# Patient Record
Sex: MALE | Race: WHITE | Employment: FULL TIME | URBAN - METROPOLITAN AREA
[De-identification: names, ages, dates, MRNs, and addresses within clinical notes are randomized per-mention and may not be internally consistent; named-entity substitution may affect disease eponyms.]

---

## 2020-11-24 ENCOUNTER — VIRTUAL VISIT (OUTPATIENT)
Dept: FAMILY MEDICINE CLINIC | Age: 40
End: 2020-11-24
Payer: COMMERCIAL

## 2020-11-24 DIAGNOSIS — E66.9 OBESITY, UNSPECIFIED CLASSIFICATION, UNSPECIFIED OBESITY TYPE, UNSPECIFIED WHETHER SERIOUS COMORBIDITY PRESENT: ICD-10-CM

## 2020-11-24 DIAGNOSIS — F32.A ANXIETY AND DEPRESSION: ICD-10-CM

## 2020-11-24 DIAGNOSIS — Z76.89 ENCOUNTER TO ESTABLISH CARE WITH NEW DOCTOR: Primary | ICD-10-CM

## 2020-11-24 DIAGNOSIS — F41.9 ANXIETY AND DEPRESSION: ICD-10-CM

## 2020-11-24 DIAGNOSIS — Z13.1 SCREENING FOR DIABETES MELLITUS: ICD-10-CM

## 2020-11-24 DIAGNOSIS — Z13.220 SCREENING FOR LIPID DISORDERS: ICD-10-CM

## 2020-11-24 PROCEDURE — 99204 OFFICE O/P NEW MOD 45 MIN: CPT | Performed by: FAMILY MEDICINE

## 2020-11-24 RX ORDER — BUPROPION HYDROCHLORIDE 150 MG/1
150 TABLET ORAL
Qty: 30 TAB | Refills: 2 | Status: SHIPPED | OUTPATIENT
Start: 2020-11-24 | End: 2021-01-05

## 2020-11-24 NOTE — PROGRESS NOTES
Edie Jarrett, who was evaluated through a synchronous (real-time) audio-video encounter, and/or his healthcare decision maker, is aware that it is a billable service, with coverage as determined by his insurance carrier. He provided verbal consent to proceed: YES, and patient identification was verified. It was conducted pursuant to the emergency declaration under the [facility], [facility] authority and the Rajat Apperian and [employer] General Act. A caregiver was present when appropriate. Ability to conduct physical exam was limited. I was at home. The patient was at home. This virtual visit was conducted via [employer]. Pursuant to the emergency declaration under the [facility], [address] [place] [address] authority and the [employer] and [employer] Act, this Virtual  Visit was conducted to reduce the patient's risk of exposure to COVID-19 and provide continuity of care for an established patient. Services were provided through a video synchronous discussion virtually to substitute for in-person clinic visit. Due to this being a TeleHealth evaluation, many elements of the physical examination are unable to be assessed. Total Time: minutes: 11-20 minutes. Kiran Lara MD    [id number]  Subjective:   Edie Jarrett was seen for New Patient    Hx of anxiety, panic attacks, and depression. Has had increased anxiety due to COVID. 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 He would like ADA paperwork to allow him to work remotely at home and attend properly socially distanced meetings. He also reports increased weight gain. Currently weights 274 lb at 5'11\". Previously took Lexapro which made him feel manic; does not have any manic symptoms at baseline. Open to trying other medications.      Prior to Admission medications    Medication Sig Start Date End Date Taking? Authorizing Provider   loratadine (CLARITIN) 10 mg tablet Take 10 mg by mouth. Provider, Historical   MULTIVITAMIN/IRON/FOLIC ACID (CENTRUM COMPLETE PO) Take  by mouth. Provider, Historical       No Known Allergies    Review of Systems   Constitutional: Negative for chills, fever, malaise/fatigue and weight loss. HENT: Negative for hearing loss, nosebleeds and sore throat. Respiratory: Negative for cough, sputum production, shortness of breath and wheezing. Cardiovascular: Negative for chest pain, palpitations, leg swelling and PND. Gastrointestinal: Negative for abdominal pain, blood in stool, constipation, diarrhea, nausea and vomiting. Genitourinary: Negative for dysuria, frequency and urgency. Musculoskeletal: Negative for back pain, falls, joint pain, myalgias and neck pain. Skin: Negative for itching and rash. Neurological: Negative for dizziness, sensory change, focal weakness and loss of consciousness. Psychiatric/Behavioral: Negative for depression. The patient is not nervous/anxious. All other systems reviewed and are negative.         Past Medical History:   Diagnosis Date    Allergic rhinitis     Anxiety and depression     HTN, goal below 140/90     Scoliosis        Past Surgical History:   Procedure Laterality Date    HX WISDOM TEETH EXTRACTION         Family History   Problem Relation Age of Onset    Hypertension Father     High Cholesterol Father     Heart Disease Father     Breast Cancer Paternal Aunt         breast    Diabetes Maternal Grandfather     Cancer Maternal Grandfather     Breast Cancer Paternal Grandmother     Cancer Paternal Uncle         Bone    Prostate Cancer Maternal Uncle        Social History     Socioeconomic History    Marital status: SINGLE     Spouse name: Not on file    Number of children: Not on file    Years of education: Not on file    Highest education level: Not on file   Occupational History    Not on file   Social Needs    Financial resource strain: Not on file    Food insecurity     Worry: Not on file     Inability: Not on file    Transportation needs     Medical: Not on file     Non-medical: Not on file   Tobacco Use    Smoking status: Former Smoker     Packs/day: 1.50     Years: 15.00     Pack years: 22.50     Types: Cigarettes    Smokeless tobacco: Never Used   Substance and Sexual Activity    Alcohol use: Yes     Frequency: 2-4 times a month    Drug use: Never    Sexual activity: Yes     Partners: Female   Lifestyle    Physical activity     Days per week: Not on file     Minutes per session: Not on file    Stress: Not on file   Relationships    Social connections     Talks on phone: Not on file     Gets together: Not on file     Attends [employer] service: Not on file     Active member of club or organization: Not on file     Attends meetings of clubs or organizations: Not on file     Relationship status: Not on file    Intimate partner violence     Fear of current or ex partner: Not on file     Emotionally abused: Not on file     Physically abused: Not on file     Forced sexual activity: Not on file   Other Topics Concern    Not on file   Social History Narrative    Not on file         Physical Exam:     There were no vitals taken for this visit. General: alert, cooperative, no distress   Mental  status: normal mood, behavior, speech, dress, motor activity, and thought processes, able to follow commands   HENT: NCAT   Neck: no visualized mass   Resp: no respiratory distress   Neuro: no gross deficits   Skin: no discoloration or lesions of concern on visible areas   Psychiatric: normal affect, consistent with stated mood, no evidence of hallucinations       Assessment & Plan:     Antonio Cutler is a 45 y.o. male who presents today for:    1. Encounter to establish care with new doctor    2. Anxiety and depression  Will start Wellbutrin. Will look out for his paperwork to support working from home due to COVID-related anxiety and obesity as a possible COVID risk factor.  - REFERRAL TO BEHAVIORAL HEALTH  - buPROPion XL (WELLBUTRIN XL) 150 mg tablet; Take 1 Tab by mouth every morning. Dispense: 30 Tab; Refill: 2    3. Obesity, unspecified classification, unspecified obesity type, unspecified whether serious comorbidity present  I have reviewed/discussed the above normal BMI with the patient. I have recommended the following interventions: dietary management education, guidance, and counseling, encourage exercise, monitor weight and prescribed dietary intake.   - TSH 3RD GENERATION; Future  - T4 (THYROXINE); Future    4. Screening for diabetes mellitus  - HEMOGLOBIN A1C WITH EAG; Future    5. Screening for lipid disorders  - CBC W/O DIFF; Future  - METABOLIC PANEL, COMPREHENSIVE; Future  - LIPID PANEL; Future    Medications Discontinued During This Encounter   Medication Reason    loratadine (CLARITIN) 10 mg tablet Therapy Completed    MULTIVITAMIN/IRON/FOLIC ACID (CENTRUM COMPLETE PO) Therapy Completed     Follow-up and Dispositions    · Return in about 6 weeks (around 1/5/2021) for Medication Check. Treatment risks/benefits/costs/interactions/alternatives discussed with patient. Advised patient to call back or return to office if symptoms worsen/change/persist. If patient cannot reach us or should anything more severe/urgent arise he/she should proceed directly to the nearest emergency department. Discussed expected course/resolution/complications of diagnosis in detail with patient. Patient expressed understanding with the diagnosis and plan. Thomas Nuñez M.D.

## 2020-11-25 ENCOUNTER — VIRTUAL VISIT (OUTPATIENT)
Dept: FAMILY MEDICINE CLINIC | Age: 40
End: 2020-11-25
Payer: COMMERCIAL

## 2020-11-25 DIAGNOSIS — F32.A ANXIETY AND DEPRESSION: Primary | ICD-10-CM

## 2020-11-25 DIAGNOSIS — F41.9 ANXIETY AND DEPRESSION: Primary | ICD-10-CM

## 2020-11-25 PROCEDURE — 90791 PSYCH DIAGNOSTIC EVALUATION: CPT | Performed by: SOCIAL WORKER

## 2020-11-25 NOTE — PROGRESS NOTES
This note will not be viewable in [facility] for the following reason(s). This is a Psychotherapy Note. ([address], [facility] [address] Providers Only)   Virtual INITIAL AT HOME appt:  Met with Mr. Lewis Tomlinson today for our first appt. He identifies anxiety and depressive symptoms being present for the past 20 years. He started therapy at age 24 and has been in and out of therapy since. He identifies panic, anxiety, obsessive thinking, overthinking issues, sensitivity, emotional eating (has gained 130 lbs in the past 3-1/2 years) and having difficulty getting out of bed due to feeling so sad in the past.  He has never identifies suicidal thoughts however he does report using alcohol in the past as a crutch for his depression. Mr. Lewis Tomlinson grew up in [place] [address]. His parents, both 72 reside in [place] [address] and his 35year old brother just recently moved to [place]. His parents have been  40+ years. His mother is a retired  and his father continues to work as a . He reports that his father and other family on both sides have alcohol issues. Mr. Lewis Tomlinson states that he can not control his alcohol and it is just better \"not to  any alcohol. \"  Additionally he reports that there is anxiety and depression with his father but he doesn't receive counseling or medication to assist.    Mr. Lewis Tomlinson left [place] and attended college at [employer] for his undergraduate degree. He then came back to [place] and completed his graduate degree at [employer] in [facility] [employer]. He is currently employed as the [email or web address] for [employer]. He was engaged at one time but is now single. He would like to meet someone and get  in the future. Mr. Lewis Tomlinson used to take medication but had adverse side effects while taking lexapro (reports saritha). He has recently been prescribed Wellbutrin by his PCP, Dr. Kadeem Coe. Mr. Lewis Tomlinson is a 45year old male that presents as alert and oriented.   His speech was somewhat pressured and verbose. He denies any suicidal or homicidal ideations. He is not psychotic or delusional.  He has good insight and judgement. He reports he is an emotional/stress eater and needs to get back into exercising and better eating habits. His sleep is good. He maintained good eye contact. With his long history of therapy and his request to continue with long term-on going therapy, this therapist recommended some community resources. We have scheduled a follow up however, in case he can not get in to see someone in a timely manner. We scheduled a follow up on Dec. 15 @ 4.   Radha rick LCSW

## 2021-01-05 ENCOUNTER — VIRTUAL VISIT (OUTPATIENT)
Dept: FAMILY MEDICINE CLINIC | Age: 41
End: 2021-01-05
Payer: COMMERCIAL

## 2021-01-05 DIAGNOSIS — F39 MOOD DISORDER (HCC): ICD-10-CM

## 2021-01-05 DIAGNOSIS — E66.9 OBESITY, UNSPECIFIED CLASSIFICATION, UNSPECIFIED OBESITY TYPE, UNSPECIFIED WHETHER SERIOUS COMORBIDITY PRESENT: ICD-10-CM

## 2021-01-05 DIAGNOSIS — I10 HTN, GOAL BELOW 140/90: Primary | ICD-10-CM

## 2021-01-05 PROCEDURE — 99214 OFFICE O/P EST MOD 30 MIN: CPT | Performed by: FAMILY MEDICINE

## 2021-01-05 RX ORDER — AMLODIPINE BESYLATE 5 MG/1
5 TABLET ORAL DAILY
Qty: 30 TAB | Refills: 2 | Status: SHIPPED | OUTPATIENT
Start: 2021-01-05 | End: 2021-04-22

## 2021-01-05 NOTE — PROGRESS NOTES
Teresa Vickers, who was evaluated through a synchronous (real-time) audio-video encounter, and/or his healthcare decision maker, is aware that it is a billable service, with coverage as determined by his insurance carrier. He provided verbal consent to proceed: YES, and patient identification was verified. It was conducted pursuant to the emergency declaration under the [facility], [facility] authority and the Rajat Celly and [employer] General Act. A caregiver was present when appropriate. Ability to conduct physical exam was limited. I was at home. The patient was at home. This virtual visit was conducted via [employer]. Pursuant to the emergency declaration under the [facility], [address] [place] [address] authority and the [employer] and [employer] Act, this Virtual  Visit was conducted to reduce the patient's risk of exposure to COVID-19 and provide continuity of care for an established patient. Services were provided through a video synchronous discussion virtually to substitute for in-person clinic visit. Due to this being a TeleHealth evaluation, many elements of the physical examination are unable to be assessed. Total Time: minutes: 5-10 minutes. Dorothy Murray MD    755  Subjective:   Teresa Vickers was seen for:    Never started Wellbutrin. His father had a bad reaction to Wellbutrin. Pt thinks he might have ADHD. Wants to see a psychiatrist/psychologist to get tested. Has started checking home BPs; numbers are in 140-150's/90s. Has lost 7.5 lbs since using a treadmill. Prior to Admission medications    Medication Sig Start Date End Date Taking? Authorizing Provider   buPROPion XL (WELLBUTRIN XL) 150 mg tablet Take 1 Tab by mouth every morning.  11/24/20   Bobo Jeter MD       No Known Allergies    Review of Systems   Constitutional: Negative for fever, malaise/fatigue and weight loss. Respiratory: Negative for cough, hemoptysis, shortness of breath and wheezing. Cardiovascular: Negative for chest pain, palpitations, leg swelling and PND. Gastrointestinal: Negative for abdominal pain, constipation, diarrhea, nausea and vomiting. Physical Exam:     There were no vitals taken for this visit. General: alert, cooperative, no distress   Mental  status: normal mood, behavior, speech, dress, motor activity, and thought processes, able to follow commands   HENT: NCAT   Neck: no visualized mass   Resp: no respiratory distress   Neuro: no gross deficits   Skin: no discoloration or lesions of concern on visible areas   Psychiatric: normal affect, consistent with stated mood, no evidence of hallucinations       Assessment & Plan:     Manuela Montes De Oca is a 45 y.o. male who presents today for:    1. HTN, goal below 140/90  Start amlodipine. - amLODIPine (NORVASC) 5 mg tablet; Take 1 Tab by mouth daily. Dispense: 30 Tab; Refill: 2  [facility]    2. Obesity, unspecified classification, unspecified obesity type, unspecified whether serious comorbidity present  - REFERRAL TO DIETITIAN    3. Mood disorder ([facility] [address]  Recommend pt to see psychiatry/psychologist for neuropsychological testing and possible medication management. Medications Discontinued During This Encounter   Medication Reason    buPROPion XL (WELLBUTRIN XL) 150 mg tablet Not A Current Medication       Treatment risks/benefits/costs/interactions/alternatives discussed with patient. Advised patient to call back or return to office if symptoms worsen/change/persist. If patient cannot reach us or should anything more severe/urgent arise he/she should proceed directly to the nearest emergency department. Discussed expected course/resolution/complications of diagnosis in detail with patient. Patient expressed understanding with the diagnosis and plan. Thomas Aguilar M.D.

## 2021-01-22 ENCOUNTER — VIRTUAL VISIT (OUTPATIENT)
Dept: FAMILY MEDICINE CLINIC | Age: 41
End: 2021-01-22
Payer: COMMERCIAL

## 2021-01-22 DIAGNOSIS — E66.9 CLASS 2 OBESITY WITHOUT SERIOUS COMORBIDITY WITH BODY MASS INDEX (BMI) OF 36.0 TO 36.9 IN ADULT, UNSPECIFIED OBESITY TYPE: ICD-10-CM

## 2021-01-22 DIAGNOSIS — I10 HTN, GOAL BELOW 140/90: Primary | ICD-10-CM

## 2021-01-22 PROCEDURE — 99213 OFFICE O/P EST LOW 20 MIN: CPT | Performed by: FAMILY MEDICINE

## 2021-01-22 NOTE — PROGRESS NOTES
Brandon Diaz, who was evaluated through a synchronous (real-time) audio-video encounter, and/or his healthcare decision maker, is aware that it is a billable service, with coverage as determined by his insurance carrier. He provided verbal consent to proceed: YES, and patient identification was verified. It was conducted pursuant to the emergency declaration under the [facility], [facility] authority and the [employer] and [employer] General Act. A caregiver was present when appropriate. Ability to conduct physical exam was limited. I was at home. The patient was at home. This virtual visit was conducted via [employer]. Pursuant to the emergency declaration under the [facility], [address] [place] [address] authority and the [employer] and [employer] Act, this Virtual  Visit was conducted to reduce the patient's risk of exposure to COVID-19 and provide continuity of care for an established patient. Services were provided through a video synchronous discussion virtually to substitute for in-person clinic visit. Due to this being a TeleHealth evaluation, many elements of the physical examination are unable to be assessed. Total Time: minutes: 5-10 minutes. Kyle Brown MD    094  Subjective:   Brandon Diaz was seen for:    Hasn't started the amlodipine because he's been losing weight and BPs have been normal.  Works for [employer]. Is concerned about attending in person meetings which are usually 25-50 people in a room for several hours. States that people do not consistently wear masks or social distance at least 6 feet. Is wondering if it would be best for him to continue working remotely until he gets his vaccine. Was able to see psychiatry who prescribed him Adderall and Effexor.     Prior to Admission medications    Medication Sig Start Date End Date Taking? Authorizing Provider   amLODIPine (NORVASC) 5 mg tablet Take 1 Tab by mouth daily. 1/5/21   Gosia Dickson MD       No Known Allergies    Review of Systems   Constitutional: Negative for fever, malaise/fatigue and weight loss. Respiratory: Negative for cough, hemoptysis, shortness of breath and wheezing. Cardiovascular: Negative for chest pain, palpitations, leg swelling and PND. Gastrointestinal: Negative for abdominal pain, constipation, diarrhea, nausea and vomiting. Physical Exam:     There were no vitals taken for this visit. General: alert, cooperative, no distress   Mental  status: normal mood, behavior, speech, dress, motor activity, and thought processes, able to follow commands   HENT: NCAT   Neck: no visualized mass   Resp: no respiratory distress   Neuro: no gross deficits   Skin: no discoloration or lesions of concern on visible areas   Psychiatric: normal affect, consistent with stated mood, no evidence of hallucinations       Assessment & Plan:   Manuela Montes De Oca is a 45 y.o. male who presents today for:    1. HTN, goal below 140/90  Stable off of meds. Continue with weight loss. 2. Class 2 obesity without serious comorbidity with body mass index (BMI) of 36.0 to 36.9 in adult, unspecified obesity type  Advised pt to complete labs to assess other for other medical conditions. Pt will let me know his preference on continuing to work remotely. He otherwise has the risk factor of obesity for COVID complications. There are no discontinued medications. Treatment risks/benefits/costs/interactions/alternatives discussed with patient. Advised patient to call back or return to office if symptoms worsen/change/persist. If patient cannot reach us or should anything more severe/urgent arise he/she should proceed directly to the nearest emergency department. Discussed expected course/resolution/complications of diagnosis in detail with patient.   Patient expressed understanding with the diagnosis and plan. Thomas Avila M.D.

## 2021-01-25 ENCOUNTER — HOSPITAL ENCOUNTER (OUTPATIENT)
Dept: NUTRITION | Age: 41
Discharge: HOME | End: 2021-01-25

## 2021-01-25 NOTE — PROGRESS NOTES
Kelsey Gramajo was informed of the inherent limitations of a virtual visit,  and has consented to a virtual therapy visit on 2021. Information regarding emergency contact information for this patient during this visit is to contact:  Danna He at [phone] in addition to calling 911. The patient was informed that at any time during the virtual visit, they can decide to stop the virtual visit. The patient verified that they are physically located in the [facility] for this virtual visit. [address] [facility]     Nutrition Assessment - Medical Nutrition Therapy   Outpatient Initial Evaluation         Patient Name: Kelsey Gramajo : 1980   Treatment Diagnosis: Obesity    Referral Source: Scot Brar MD [facility]): 2021     In time:1:40pm           Out time:   2:40pm   Total Treatment Time (min):   60     Gender: male Age: 45 y.o. Ht: 71 in Wt:  265 lb  kg   BMI: 37 (obesity class II) BMR   Male 2139 AF 1.6-1.7     Past Medical History:  Patient Active Problem List   Diagnosis Code    HTN, goal below 140/90 I10    Anxiety and depression F41.9, F32.9    Allergic rhinitis J30.9    Scoliosis M41.9    Obesity E66.9        Pertinent Medications:     Current Outpatient Medications:     amLODIPine (NORVASC) 5 mg tablet, Take 1 Tab by mouth daily. , Disp: 30 Tab, Rfl: 2  Adderall started today   Biochemical Data:   No results found for: HBA1C, HGBE8, RDJ5ZCWP, AHC7RCBQ  Lab Results   Component Value Date/Time    Sodium 141 2016 10:04 AM    Potassium 4.6 2016 10:04 AM    Chloride 98 2016 10:04 AM    CO2 24 2016 10:04 AM    Glucose 92 2016 10:04 AM    BUN 13 2016 10:04 AM    Creatinine 0.97 2016 10:04 AM    BUN/Creatinine ratio 13 2016 10:04 AM    GFR est  2016 10:04 AM    GFR est non- 2016 10:04 AM    Calcium 10.0 2016 10:04 AM    Bilirubin, total 0.8 04/12/2016 10:04 AM    Alk. phosphatase 52 04/12/2016 10:04 AM    Protein, total 7.2 04/12/2016 10:04 AM    Albumin 5.1 04/12/2016 10:04 AM    A-G Ratio 2.4 04/12/2016 10:04 AM    ALT (SGPT) 30 04/12/2016 10:04 AM    AST (SGOT) 31 04/12/2016 10:04 AM     Lab Results   Component Value Date/Time    Cholesterol, total 191 04/12/2016 10:04 AM    HDL Cholesterol 41 04/12/2016 10:04 AM    LDL, calculated 126 (H) 04/12/2016 10:04 AM    VLDL, calculated 24 04/12/2016 10:04 AM    Triglyceride 119 04/12/2016 10:04 AM   labs taken when pt weighted 180#. New labs scheduled to be collected. BP Readings from Last 3 Encounters:   07/14/16 120/78   04/12/16 (!) 129/100   120/80 most days. Higher on weekends when consuming food out. Assessment:   Pt is a 43yo male here today for help with weight loss. pt note beoing about 100# overweight. Felt most comfortable at 160-165#. History of high blood pressure, elevated cholesterol, anxiety and depression. No recent labs to review but plans to have those for next session. Checking blood pressure at home daily. Notes that when he diests and starts exercise his blood pressure comes right back down. Today 144/100  Notes fluctuating especially with sodium intake from food. Notes stress eating related to job stress for past 3 years. Starting at 203# 2017 when starting the job. Highest weight 272.8#  Notes weight fluctuations with food choices as well as much as 6#. Unable to tell if he is retaining fluid. Made changes 8-9 years ago tried nutrisystem and started workout out. Weight came out without issues. Kept weight off for a few years and then slowly started incresaing in weight. Medical history, as a child was underweight. Long distance runner. Had issues trying to keep and gain weight. No difference in eating habbits from childhood to adulthood.    Note issues with stress eating, eating pat full at each meal, eating when not hungry often, and feeling shame after eating large portions. Describes compulsive over eating multiple times per day, daily. Starting to see a therapist for anxiety and depression next week. Request further evaluation from doctor for the complete DSM-5® diagnostic criteria for B.E.D. Recently bought a treadmill and has started loosing weight with adding it in daily. Typically looses weight at a rate of 2-3# per week. Has also started nutrisystem again since about Jan 3rd. Wants to start learning how to make healthy meals on his own while doing Nutrisystem to then be able to keep it off. Program has changed since last time. Note having a cheat meal 1 day per week. This meal is often ordered out fast food high in sodium. He will see higher blood pressure and weight the next day. 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 Activity: 5-6 days per week on treadmill. 30min minimum building up to 60min. 3mph 0 incline. increasing incline each time he is able to achieve 60min. Currently 3. 1mph at incline of 2. 640 kcal according to apple watch. Moving around when using the phone. Getting 20k steps when doing treadmill. 12k-13k on non-treadmill days. Food & Nutrition: Describes his eating habits as horendus. Had success with nutristystem but once not using their food, didn't know how to make those meals on his own. Currently 1 cheat day per week. Pt is a single man. Cooking for 1. Cost is a barrier. Wants to cook in bulk and freeze leftovers for his days without Nutrisystem. Describes himself as lazy when it comes to preparing food. Feeling he has more energy on the nutrisystem meals. Drinking more water  Current Vegetable intake is low sodium V8 with meals from nutrisystem. Prior to nutrisystem, Consciously eating past full nightly. Prior to nutrisystem  B- skipping  S- not at work  L- sushi or fast food. Bringing lunch occasionally.  Or out with coworkers and never ordering salad (sandwich or burger)  S- none  D- overeating nightly. Pasta or order food out. Ordering 3 meals and eat 2 that night and have 1 for the next day. Go straight to bed after eating  Drinks: water. Occasional soft drink. At movies large popcorn with butter and large high-c with refill on popcorn weekly or 2 times per week. Do you ever eat past feeling full? Yes. Nightly. None since on nutrisystem. Do you ever eat when you are not hungry? yes   Boredom  Yes. None with nutrisystem. Eating on a time schedule. Emotional eating /Stress or feeling upset yes. Estimate Needs = Equation( [x] MSJ ; []  JV; [] Melisa Carson; [] other)  * Activity Factor (1.6-1.7) -1000   Calories: 7744-5366 Protein: 145 Carbs: 316 Fat: 88   Kcal/day  g/day  g/day  g/day       Recommended= 1.2g/kg protein percent: 22  48  30      Minimum protein = 0.8g/kg = 96g         Nutrition Diagnosis Food and nutrition related knowledge deficit R/T lack of prior education for healthy sustainable weight loss AEB request for counseling    Excessive energy intakes R/T psychological causes such as depression and disordered eating patterns AEB pt report of excessive over eating multiple times per day unrelated to hunger/fullness and feelings of guilt around episodes. Unintended weight gain of 100# over past 3+ years. Nutrition Intervention &  Education: Educated pt on the basics of healthy weight loss and the rationale for dietary modifications and increased activity. Educated pt on lean proteins, healthy fats, non-starchy vegetables, and complex carbohydrate food sources. Used balanced plate and hand method for portion reduction. Recommend full DSM-5 for possible BED. Educated on saturated fat and sodium.     Handouts Provided: []  Carbohydrates  []  Protein  [x]  Non-starchy Vegatbles  []  Food Label  [x]  Meal and Snack Ideas  []  Food Journals []  Diabetes  []  Cholesterol  []  Sodium  []  Gen Nutr Guidelines  []  SBGM Guidelines  []  Others:   Information Reviewed with: pt   Readiness to Change Stage: []  Pre-contemplative    []  Contemplative  []  Preparation               [x]  Action                  []  Maintenance   Potential Barriers to Learning:                      []  Decline in memory    []  Language barrier   [x]  Other: history fo disordered eating patterns, anxiety/depression  []  Emotional                  []  Limited mobility     c  []  Lack of motivation     [] Vision, hearing or cognitive impairment   Expected Compliance: Good due to pt report of motivation level      Nutritional Goal - To promote lifestyle changes to result in:    [x]  Weight loss  []  Improved diabetic control  []  Decreased cholesterol levels  []  Decreased blood pressure  []  Weight maintenance []  Preventing any interactions associated with food allergies  []  Adequate weight gain toward goal weight  [x]  Other: prevent binge episodes and improve overall relationship with food     Patient Goals:   -1 free day per week with Nutristystem. Look at the [facility] and start making some more balanced meals based on ideas provided instead of ordering out.   -read over materials provided.   -continue increased exercise 5 days per week  -continue using NutriSystem for 6 days per week  -start seeing therapist and discuss previous eating patterns and coping mechanisms with them. Dietitian Signature: Landy Morelos MS, RD, CSSD Date: 1/25/2021   Follow-up: 2-4 weeks Time: 1:42 PM   Kelsey Gramajo is a 45 y.o. male being evaluated by a Virtual Visit (video visit) encounter to address concerns as mentioned above. A caregiver was present when appropriate.  Due to this being a TeleHealth encounter (During [place]YAH[place]-26 public health emergency), evaluation of the following areas was limited: Nutrition Focused Physical Exam. Pursuant to the emergency declaration under the [facility] [address] waiver authority and the [place] [employer] and [employer] Act, this Virtual Visit was conducted with patient's (and/or legal guardian's) consent, to reduce the risk of exposure to COVID-19 and provide necessary medical care. Services were provided through a video synchronous discussion virtually to substitute for in-person encounter. --Jamshid Whitfield on 1/25/2021 at 1:42 PM    An electronic signature was used to authenticate this note.

## 2021-02-04 ENCOUNTER — LAB ONLY (OUTPATIENT)
Dept: FAMILY MEDICINE CLINIC | Age: 41
End: 2021-02-04

## 2021-02-04 DIAGNOSIS — Z13.1 SCREENING FOR DIABETES MELLITUS: ICD-10-CM

## 2021-02-04 DIAGNOSIS — Z13.220 SCREENING FOR LIPID DISORDERS: ICD-10-CM

## 2021-02-04 DIAGNOSIS — E66.9 OBESITY, UNSPECIFIED CLASSIFICATION, UNSPECIFIED OBESITY TYPE, UNSPECIFIED WHETHER SERIOUS COMORBIDITY PRESENT: ICD-10-CM

## 2021-02-05 LAB
ALBUMIN SERPL-MCNC: 5 G/DL (ref 4–5)
ALBUMIN/GLOB SERPL: 2.1 {RATIO} (ref 1.2–2.2)
ALP SERPL-CCNC: 83 IU/L (ref 39–117)
ALT SERPL-CCNC: 41 IU/L (ref 0–44)
AST SERPL-CCNC: 24 IU/L (ref 0–40)
BILIRUB SERPL-MCNC: 0.6 MG/DL (ref 0–1.2)
BUN SERPL-MCNC: 14 MG/DL (ref 6–20)
BUN/CREAT SERPL: 12 (ref 9–20)
CALCIUM SERPL-MCNC: 9.5 MG/DL (ref 8.7–10.2)
CHLORIDE SERPL-SCNC: 100 MMOL/L (ref 96–106)
CHOLEST SERPL-MCNC: 190 MG/DL (ref 100–199)
CO2 SERPL-SCNC: 23 MMOL/L (ref 20–29)
CREAT SERPL-MCNC: 1.14 MG/DL (ref 0.76–1.27)
ERYTHROCYTE [DISTWIDTH] IN BLOOD BY AUTOMATED COUNT: 12.9 % (ref 11.6–15.4)
EST. AVERAGE GLUCOSE BLD GHB EST-MCNC: 114 MG/DL
GLOBULIN SER CALC-MCNC: 2.4 G/DL (ref 1.5–4.5)
GLUCOSE SERPL-MCNC: 117 MG/DL (ref 65–99)
HBA1C MFR BLD: 5.6 % (ref 4.8–5.6)
HCT VFR BLD AUTO: 42.7 % (ref 37.5–51)
HDLC SERPL-MCNC: 32 MG/DL
HGB BLD-MCNC: 14.7 G/DL (ref 13–17.7)
INTERPRETATION, 910389: NORMAL
LDLC SERPL CALC-MCNC: 112 MG/DL (ref 0–99)
MCH RBC QN AUTO: 31 PG (ref 26.6–33)
MCHC RBC AUTO-ENTMCNC: 34.4 G/DL (ref 31.5–35.7)
MCV RBC AUTO: 90 FL (ref 79–97)
PLATELET # BLD AUTO: 338 X10E3/UL (ref 150–450)
POTASSIUM SERPL-SCNC: 5.1 MMOL/L (ref 3.5–5.2)
PROT SERPL-MCNC: 7.4 G/DL (ref 6–8.5)
RBC # BLD AUTO: 4.74 X10E6/UL (ref 4.14–5.8)
SODIUM SERPL-SCNC: 138 MMOL/L (ref 134–144)
T4 SERPL-MCNC: 8.2 UG/DL (ref 4.5–12)
TRIGL SERPL-MCNC: 261 MG/DL (ref 0–149)
TSH SERPL DL<=0.005 MIU/L-ACNC: 1.78 UIU/ML (ref 0.45–4.5)
VLDLC SERPL CALC-MCNC: 46 MG/DL (ref 5–40)
WBC # BLD AUTO: 6 X10E3/UL (ref 3.4–10.8)

## 2021-02-05 NOTE — PROGRESS NOTES
Dear Mr. Emilia Carbone,    I wanted to follow up on your recent test results:    Triglycerides are high, which may be improved with 1 g/day fish oil supplement (that would contain between 200 to 800 mg of EPA+DHA, depending on the formulation) or one to two servings per week of oily fish (i.e. [place], trout, tuna). .  LDL cholesterol is a little high.    Other labs are normal.

## 2021-02-26 ENCOUNTER — HOSPITAL ENCOUNTER (OUTPATIENT)
Dept: NUTRITION | Age: 41
Discharge: HOME | End: 2021-02-26

## 2021-02-26 NOTE — PROGRESS NOTES
Shakir Stapleton was informed of the inherent limitations of a virtual visit,  and has consented to a virtual therapy visit on 2021.  Information regarding emergency contact information for this patient during this visit is to contact:  no contact provided in addition to calling 911.  The patient was informed that at any time during the virtual visit, they can decide to stop the virtual visit.  The patient verified that they are physically located in the [facility] for this virtual visit.     NUTRITION - FOLLOW-UP TREATMENT NOTE  Patient Name: Shakir Stapleton         Date: 2021  : 1980    YES Patient  Verified  Diagnosis: Obesity    In time: 1:00pm         Out time:   1:45pm   Total Treatment Time (min):   45     SUBJECTIVE/ASSESSMENT  Current Wt: 247.8 Previous Wt: 265 Wt Change: -17.2     Initial Wt: 265 Total Wt change: -17.2 Height: 71     Changes in medication or medical history? Any new allergies, surgeries or procedures?    /NO    If yes, update Summary List        Nutrition Diagnosis        Diagnosis Status: Food and nutrition related knowledge deficit R/T lack of prior education for healthy sustainable weight loss AEB request for counseling  [x]  Improved []  No Change    []  Declined   []  Discontinued     Excessive energy intakes R/T psychological causes such as depression and disordered eating patterns AEB pt report of excessive over eating multiple times per day unrelated to hunger/fullness and feelings of guilt around episodes. Unintended weight gain of 100# over past 3+ years.  [x]  Improved []  No Change    []  Declined   []  Discontinued        Nutrition Monitoring and Evaluation: Lab Results   Component Value Date/Time    Hemoglobin A1c 5.6 2021 08:06 AM     Lab Results   Component Value Date/Time    WBC 6.0 2021 08:06 AM    HGB 14.7 2021 08:06 AM    HCT 42.7 2021 08:06 AM    PLATELET 338 2021 08:06 AM    MCV 90 2021 08:06 AM     Lab  Results   Component Value Date/Time    Sodium 138 02/04/2021 08:06 AM    Potassium 5.1 02/04/2021 08:06 AM    Chloride 100 02/04/2021 08:06 AM    CO2 23 02/04/2021 08:06 AM    Glucose 117 (H) 02/04/2021 08:06 AM    BUN 14 02/04/2021 08:06 AM    Creatinine 1.14 02/04/2021 08:06 AM    BUN/Creatinine ratio 12 02/04/2021 08:06 AM    GFR est AA 93 02/04/2021 08:06 AM    GFR est non-AA 81 02/04/2021 08:06 AM    Calcium 9.5 02/04/2021 08:06 AM    Bilirubin, total 0.6 02/04/2021 08:06 AM    Alk. phosphatase 83 02/04/2021 08:06 AM    Protein, total 7.4 02/04/2021 08:06 AM    Albumin 5.0 02/04/2021 08:06 AM    A-G Ratio 2.1 02/04/2021 08:06 AM    ALT (SGPT) 41 02/04/2021 08:06 AM    AST (SGOT) 24 02/04/2021 08:06 AM     Pt ate a popsicle due to light headedness during blood drawn. Lab Results   Component Value Date/Time    Cholesterol, total 190 02/04/2021 08:06 AM    HDL Cholesterol 32 (L) 02/04/2021 08:06 AM    LDL, calculated 112 (H) 02/04/2021 08:06 AM    LDL, calculated 126 (H) 04/12/2016 10:04 AM    VLDL, calculated 46 (H) 02/04/2021 08:06 AM    VLDL, calculated 24 04/12/2016 10:04 AM    Triglyceride 261 (H) 02/04/2021 08:06 AM     Less busy schedule at work. Continuing with exercise as able due to work schedule. 11-12k steps on days not using treadmill. Still using nutrisystem. 25# down from start. Issues with grocery  that limited cooking ability. Not ordering out. Using Nutrisystem on all days. Started counseling. He notes they are looking into BED but have not yet diagnosed him. Has some nervousness about future transition to not using nutrisystem. Blood pressure has been improving 120/80 reported today. Previous goals: Shopping issues. Using nutrisystem for all meals. -1 free day per week with Nutristystem. Look at the [address] and start making some more balanced meals based on ideas provided instead of ordering out. Met. -read over materials provided.    Increasing slowly speed and incline (3.5mph for incline of 2) for 60 min 4-5 times per week. -continue increased exercise 5 days per week  Met. Continued. -continue using NutriSystem for 6 days per week  Met. -start seeing therapist and discuss previous eating patterns and coping mechanisms with them. Nutrition Prescription and  Intervention Provided recipes. Educated on ways to adjust recipes when missing ingredients. Reviewed labs. Answered questions about nutrition related to lab values. Recommendation to continue working towards 200min of exercise including high intensity, moderate, strength exercise     Patient Education:  [x]  Review current plan with patient   []  Other:    Handouts/  Information Provided: []  Carbohydrates  []  Protein  []  Fiber  []  Serving Sizes  []  Fluids  []  General guidelines []  Diabetes  []  Cholesterol  []  Sodium  []  SBGM  []  Food Journals  []  Others:      Patient Goals -make 1 meal per week at home  -continue 4-5 days per week of walking  -continue using Nutrisystem 6 days per week  -look over recipes provided. PLAN  [x]  Continue on current plan []  Follow-up PRN   []  Discharge due to :    [x]  Next appt: 2-4 weeks     Dietitian: Reagan Valero MS, RD, CSSD    Date: 2/26/2021 Time: 1:01 PM     Micha Pope is a 45 y.o. male being evaluated by a Virtual Visit (video visit) encounter to address concerns as mentioned above. A caregiver was present when appropriate. Due to this being a TeleHealth encounter (During [place] [address] public health emergency), evaluation of the following areas was limited: Nutrition Focused Physical Exam. Pursuant to the emergency declaration under the [address] [place] [address], [address] [place] [address] authority and the [employer] and [employer] Act, this Virtual Visit was conducted with patient's (and/or legal guardian's) consent, to reduce the risk of exposure to COVID-19 and provide necessary medical care. Services were provided through a video synchronous discussion virtually to substitute for in-person encounter. --Ramon Crowder on 2/26/2021 at 1:01 PM    An electronic signature was used to authenticate this note.

## 2021-03-15 ENCOUNTER — HOSPITAL ENCOUNTER (OUTPATIENT)
Dept: NUTRITION | Age: 41
Discharge: HOME | End: 2021-03-15

## 2021-04-22 ENCOUNTER — OFFICE VISIT (OUTPATIENT)
Dept: FAMILY MEDICINE CLINIC | Age: 41
End: 2021-04-22
Payer: COMMERCIAL

## 2021-04-22 VITALS
RESPIRATION RATE: 18 BRPM | HEIGHT: 71 IN | DIASTOLIC BLOOD PRESSURE: 88 MMHG | HEART RATE: 77 BPM | WEIGHT: 256 LBS | BODY MASS INDEX: 35.84 KG/M2 | SYSTOLIC BLOOD PRESSURE: 126 MMHG | TEMPERATURE: 98 F | OXYGEN SATURATION: 97 %

## 2021-04-22 DIAGNOSIS — Z01.30 BP CHECK: Primary | ICD-10-CM

## 2021-04-22 DIAGNOSIS — F98.8 ATTENTION DEFICIT DISORDER, UNSPECIFIED HYPERACTIVITY PRESENCE: ICD-10-CM

## 2021-04-22 PROCEDURE — 99213 OFFICE O/P EST LOW 20 MIN: CPT | Performed by: FAMILY MEDICINE

## 2021-04-22 RX ORDER — DEXTROAMPHETAMINE SACCHARATE, AMPHETAMINE ASPARTATE, DEXTROAMPHETAMINE SULFATE AND AMPHETAMINE SULFATE 1.25; 1.25; 1.25; 1.25 MG/1; MG/1; MG/1; MG/1
TABLET ORAL
COMMUNITY
Start: 2021-04-08

## 2021-04-22 NOTE — PROGRESS NOTES
Patient Name: Shira Barker   MRN: [id number]    Shara Busch is a [address] y.o. male who presents with the following:     Blood pressure remains well controlled. Has been trying to exercise and lose weight. Previously was followed by psychiatry who started him on Adderall 5 mg twice daily for ADD. His psychiatrist has since stopped practicing so he is in the process of looking for a new one. BP Readings from Last 3 Encounters:   04/22/21 126/88   07/14/16 120/78   04/12/16 (!) 129/100       Review of Systems   Constitutional: Negative for fever, malaise/fatigue and weight loss. Respiratory: Negative for cough, hemoptysis, shortness of breath and wheezing. Cardiovascular: Negative for chest pain, palpitations, leg swelling and PND. Gastrointestinal: Negative for abdominal pain, constipation, diarrhea, nausea and vomiting. The patient's medications, allergies, past medical history, surgical history, family history and social history were reviewed and updated where appropriate. Prior to Admission medications    Medication Sig Start Date End Date Taking? Authorizing Provider   dextroamphetamine-amphetamine (ADDERALL) 5 mg tablet TAKE 1 TABLET BY MOUTH TWICE A DAY 4/8/21  Yes Provider, Historical   multivitamin, tx-iron-ca-min (THERA-M w/ IRON) 9 mg iron-400 mcg tab tablet Take 1 Tab by mouth daily. Yes Provider, Historical   amLODIPine (NORVASC) 5 mg tablet Take 1 Tab by mouth daily. 1/5/21   Munir Trevizo MD       No Known Allergies      OBJECTIVE    Visit Vitals  /88 (BP 1 Location: Left upper arm, BP Patient Position: Sitting, BP Cuff Size: Adult)   Pulse 77   Temp 98 °F (36.7 °C) (Temporal)   Resp 18   Ht 5' 11\" (1.803 m)   Wt 256 lb (116.1 kg)   SpO2 97%   BMI 35.70 kg/m²       Physical Exam  Vitals signs and nursing note reviewed. Constitutional:       General: He is not in acute distress. Appearance: He is not diaphoretic.    Eyes:      Conjunctiva/sclera: Conjunctivae normal.      Pupils: Pupils are equal, round, and reactive to light. Cardiovascular:      Rate and Rhythm: Normal rate and regular rhythm. Heart sounds: Normal heart sounds. No murmur. No friction rub. No gallop. Pulmonary:      Effort: Pulmonary effort is normal. No respiratory distress. Breath sounds: Normal breath sounds. No wheezing. Skin:     General: Skin is warm and dry. Neurological:      Mental Status: He is alert. ASSESSMENT AND PLAN  Steffany Riggins is a 37 y.o. male who presents today for:    1. BP check  Normal. Continue with lifestyle improvements. 2. Attention deficit disorder, unspecified hyperactivity presence  List of psychiatrist given to patient. Medications Discontinued During This Encounter   Medication Reason    amLODIPine (NORVASC) 5 mg tablet LIST CLEANUP     Follow-up and Dispositions    · Return in about 10 months (around 2/22/2022) for CPE (30 min). Treatment risks/benefits/costs/interactions/alternatives discussed with patient. Advised patient to call back or return to office if symptoms worsen/change/persist. If patient cannot reach us or should anything more severe/urgent arise he/she should proceed directly to the nearest emergency department. Discussed expected course/resolution/complications of diagnosis in detail with patient. Patient expressed understanding with the diagnosis and plan. Thomas Garcia M.D.

## 2021-04-22 NOTE — PROGRESS NOTES
Chief Complaint   Patient presents with    Hyperactivity     Follow up     1. Have you been to the ER, urgent care clinic since your last visit? Hospitalized since your last visit? No    2. Have you seen or consulted any other health care providers outside of the [address] [place] [address] since your last visit? Include any pap smears or colon screening.  No

## 2021-04-22 NOTE — PATIENT INSTRUCTIONS
[facility] [phone] **[facility] [address], [address] [address] 
[phone] **[facility] [address], [address] 
[phone] [address] [facility], [address] [place] [address] Road 
[phone] [address] [place] [address] 
[phone] [facility] [address] 
[phone] [address] [facility] 35 # Roman Villar, [address] 
[phone] [address] [place] [address], [address] 
[phone] [facility] [address] [place], [address] [facility] 
[phone]

## 2021-09-28 ENCOUNTER — VIRTUAL VISIT (OUTPATIENT)
Dept: FAMILY MEDICINE CLINIC | Age: 41
End: 2021-09-28
Payer: COMMERCIAL

## 2021-09-28 DIAGNOSIS — F31.60 BIPOLAR AFFECTIVE DISORDER, CURRENT EPISODE MIXED, CURRENT EPISODE SEVERITY UNSPECIFIED (HCC): ICD-10-CM

## 2021-09-28 DIAGNOSIS — K21.9 GASTROESOPHAGEAL REFLUX DISEASE, UNSPECIFIED WHETHER ESOPHAGITIS PRESENT: Primary | ICD-10-CM

## 2021-09-28 PROBLEM — F31.9 BIPOLAR DISORDER (HCC): Status: ACTIVE | Noted: 2021-09-28

## 2021-09-28 PROBLEM — F90.9 ADHD: Status: ACTIVE | Noted: 2021-09-28

## 2021-09-28 PROCEDURE — 99213 OFFICE O/P EST LOW 20 MIN: CPT | Performed by: FAMILY MEDICINE

## 2021-09-28 RX ORDER — LAMOTRIGINE 200 MG/1
TABLET ORAL
COMMUNITY
Start: 2021-09-26

## 2021-09-28 NOTE — PROGRESS NOTES
Negar Caputo, who was evaluated through a synchronous (real-time) audio-video encounter, and/or his healthcare decision maker, is aware that it is a billable service, with coverage as determined by his insurance carrier. He provided verbal consent to proceed: YES, and patient identification was verified. It was conducted pursuant to the emergency declaration under the [facility], [address] [facility] authority and the [employer] and [employer] Act. A caregiver was present when appropriate. Ability to conduct physical exam was limited. I was at home. The patient was at home. This virtual visit was conducted via [employer]. Pursuant to the emergency declaration under the [facility], [address] [place] [address] authority and the [employer] and [employer] Act, this Virtual  Visit was conducted to reduce the patient's risk of exposure to COVID-19 and provide continuity of care for an established patient. Services were provided through a video synchronous discussion virtually to substitute for in-person clinic visit. Due to this being a TeleHealth evaluation, many elements of the physical examination are unable to be assessed. Total Time: minutes: 11-20 minutes. Greyson Willis MD    710  Subjective:   Negar Caputo was seen for: Woke up suddenly last Thursday with severe chest pressure, acid, stabbing pain, and worsen with food/drink. Had some regurgitation. Hx of intermittent GERD throughout the years which usually is better with Tums. Has recently started Lamictal for bipolar disorder. Almost went to the ER for this but symptoms improved and seems to only occur when swallowing.       Current Outpatient Medications:     dextroamphetamine-amphetamine (ADDERALL) 5 mg tablet, TAKE 1 TABLET BY MOUTH TWICE A DAY, Disp: , Rfl:     multivitamin, tx-iron-ca-min (THERA-M w/ IRON) 9 mg iron-400 mcg tab tablet, Take 1 Tab by mouth daily. , Disp: , Rfl:     No Known Allergies    Review of Systems   Constitutional: Negative for fever, malaise/fatigue and weight loss. Respiratory: Negative for cough, hemoptysis, shortness of breath and wheezing. Cardiovascular: Negative for chest pain, palpitations, leg swelling and PND. Gastrointestinal: Positive for heartburn. Negative for abdominal pain, constipation, diarrhea, nausea and vomiting. Physical Exam:     Patient-Reported Vitals 2/26/2021   Patient-Reported Weight 247.8   Patient-Reported Height 5'11\"   Patient-Reported Pulse 79   Patient-Reported SpO2 98%   Patient-Reported Systolic  055   Patient-Reported Diastolic 78          General: alert, cooperative, no distress   Mental  status: normal mood, behavior, speech, dress, motor activity, and thought processes, able to follow commands   HENT: NCAT   Neck: no visualized mass   Resp: no respiratory distress   Neuro: no gross deficits   Skin: no discoloration or lesions of concern on visible areas   Psychiatric: normal affect, consistent with stated mood, no evidence of hallucinations       Assessment & Plan:   Jaime Panchal is a 37 y.o. male who presents today for:    1. Gastroesophageal reflux disease, unspecified whether esophagitis present  Sounds like GERD. Recommend stopping V8. Can try OTC Prilosec. Provided Dr. Viviane Mooney office information; pt to call back for referral if needed. Reviewed signs and symptoms that would indicate a worsening medical condition which would require immediate evaluation and treatment; patient expressed understanding of plan. There are no discontinued medications. Treatment risks/benefits/costs/interactions/alternatives discussed with patient.   Advised patient to call back or return to office if symptoms worsen/change/persist. If patient cannot reach us or should anything more severe/urgent arise he/she should proceed directly to the nearest emergency department. Discussed expected course/resolution/complications of diagnosis in detail with patient. Patient expressed understanding with the diagnosis and plan. This dictation may have been completed with Dragon, the computer voice recognition software. Unanticipated grammatical, syntax, homophones, and other interpretive errors are sometimes inadvertently transcribed by the computer software. Please disregard any errors that have escaped final proofreading. Thomas Leslie M.D.

## 2022-03-18 PROBLEM — E66.9 OBESITY: Status: ACTIVE | Noted: 2020-11-24

## 2022-03-18 PROBLEM — F31.9 BIPOLAR DISORDER (HCC): Status: ACTIVE | Noted: 2021-09-28

## 2022-03-19 PROBLEM — F90.9 ADHD: Status: ACTIVE | Noted: 2021-09-28

## 2023-05-22 RX ORDER — DEXTROAMPHETAMINE SACCHARATE, AMPHETAMINE ASPARTATE, DEXTROAMPHETAMINE SULFATE AND AMPHETAMINE SULFATE 1.25; 1.25; 1.25; 1.25 MG/1; MG/1; MG/1; MG/1
1 TABLET ORAL 2 TIMES DAILY
COMMUNITY
Start: 2021-04-08

## 2023-05-22 RX ORDER — LAMOTRIGINE 200 MG/1
TABLET ORAL
COMMUNITY
Start: 2021-09-26